# Patient Record
Sex: FEMALE | Race: BLACK OR AFRICAN AMERICAN | NOT HISPANIC OR LATINO
[De-identification: names, ages, dates, MRNs, and addresses within clinical notes are randomized per-mention and may not be internally consistent; named-entity substitution may affect disease eponyms.]

---

## 2019-02-26 PROBLEM — Z00.00 ENCOUNTER FOR PREVENTIVE HEALTH EXAMINATION: Status: ACTIVE | Noted: 2019-02-26

## 2019-02-28 ENCOUNTER — APPOINTMENT (OUTPATIENT)
Dept: ORTHOPEDIC SURGERY | Facility: CLINIC | Age: 54
End: 2019-02-28

## 2019-02-28 ENCOUNTER — OUTPATIENT (OUTPATIENT)
Dept: OUTPATIENT SERVICES | Facility: HOSPITAL | Age: 54
LOS: 1 days | End: 2019-02-28
Payer: COMMERCIAL

## 2019-02-28 PROCEDURE — 73140 X-RAY EXAM OF FINGER(S): CPT

## 2019-02-28 PROCEDURE — 73140 X-RAY EXAM OF FINGER(S): CPT | Mod: 26,LT

## 2019-02-28 NOTE — PROCEDURE
[FreeTextEntry1] : Patient advised to perform twice daily soaks and follow-up in 2 weeks for wound check. At this time wound is clean and healing well, will continue to monitor.

## 2019-02-28 NOTE — PHYSICAL EXAM
[de-identified] : Left finger tip shows perfusion, granulated tissue. No evidence of erythema or cellulitis. Range of motion intact, limited by pain [de-identified] : Plain films show intact alignment, missing 5th digit finger nail, no evidence of fracture

## 2019-02-28 NOTE — PHYSICAL EXAM
[de-identified] : Left finger tip shows perfusion, granulated tissue. No evidence of erythema or cellulitis. Range of motion intact, limited by pain [de-identified] : Plain films show intact alignment, missing 5th digit finger nail, no evidence of fracture

## 2019-02-28 NOTE — HISTORY OF PRESENT ILLNESS
[de-identified] : Patient sustained a left 5th digit injury on 2/22/2019. Avulsion/crush injury of finger tip, was initially seen and treated at urgent care. \par Placed into splint and sent here to follow-up.

## 2019-02-28 NOTE — HISTORY OF PRESENT ILLNESS
[de-identified] : Patient sustained a left 5th digit injury on 2/22/2019. Avulsion/crush injury of finger tip, was initially seen and treated at urgent care. \par Placed into splint and sent here to follow-up.

## 2019-03-21 ENCOUNTER — APPOINTMENT (OUTPATIENT)
Dept: ORTHOPEDIC SURGERY | Facility: CLINIC | Age: 54
End: 2019-03-21

## 2019-03-21 NOTE — HISTORY OF PRESENT ILLNESS
[FreeTextEntry1] : Patient sustained a left 5th digit injury on 2/22/2019. Avulsion/crush injury of finger tip, was initially seen and treated at urgent care. \par Last seen here 2/28/2019. Has been doing well since. No complaints, chills, or fevers. No issues with hand.\par

## 2019-03-21 NOTE — PHYSICAL EXAM
[de-identified] : Left finger tip shows perfusion, granulated tissue. No evidence of erythema or cellulitis. Range of motion intact, limited by pain. Well healed.

## 2019-03-21 NOTE — ASSESSMENT
[FreeTextEntry1] : Wound well healed, counselled on continued expectations. Otherwise may follow-up as needed. All questions addressed, patient in agreement with plan.\par

## 2019-03-23 ENCOUNTER — TRANSCRIPTION ENCOUNTER (OUTPATIENT)
Age: 54
End: 2019-03-23